# Patient Record
Sex: MALE | Race: WHITE | NOT HISPANIC OR LATINO | URBAN - METROPOLITAN AREA
[De-identification: names, ages, dates, MRNs, and addresses within clinical notes are randomized per-mention and may not be internally consistent; named-entity substitution may affect disease eponyms.]

---

## 2017-11-14 ENCOUNTER — IMPORTED ENCOUNTER (OUTPATIENT)
Dept: URBAN - METROPOLITAN AREA CLINIC 31 | Facility: CLINIC | Age: 77
End: 2017-11-14

## 2017-11-14 PROBLEM — H25.13: Noted: 2017-11-14

## 2017-11-14 PROBLEM — H40.003: Noted: 2017-11-14

## 2017-11-14 PROBLEM — H43.813: Noted: 2017-11-14

## 2017-11-14 PROCEDURE — 92014 COMPRE OPH EXAM EST PT 1/>: CPT

## 2017-11-14 PROCEDURE — 92250 FUNDUS PHOTOGRAPHY W/I&R: CPT

## 2017-11-14 NOTE — PATIENT DISCUSSION
1.  Nuclear Sclerotic Cataract OU: Explained how cataracts can effect vision. Recommend clinical observation. The patient was advised to contact us if any change or worsening of vision. 2. Glaucoma suspect OU - No signs of glaucomatous damage to the optic nerve based on todays examination and testing. Will continue to monitor. Get OCT and TA/PACH in six moths. Possible VF3. PVD OU:  Patient was cautioned to call our office immediately if they experience a substantial change in their symptoms such as an increase in floaters persistent flashes loss of visual field (may appear as a shadow or a curtain) or decrease in visual acuity as these may indicate a retinal tear or detachment.   If this is a new problem patient will need to return for re-examination  as determined by the physician

## 2018-01-15 ENCOUNTER — IMPORTED ENCOUNTER (OUTPATIENT)
Dept: URBAN - METROPOLITAN AREA CLINIC 31 | Facility: CLINIC | Age: 78
End: 2018-01-15

## 2018-01-15 PROBLEM — H25.13: Noted: 2018-01-15

## 2018-01-15 PROCEDURE — 92015 DETERMINE REFRACTIVE STATE: CPT

## 2018-01-15 PROCEDURE — 99213 OFFICE O/P EST LOW 20 MIN: CPT

## 2018-03-12 ENCOUNTER — IMPORTED ENCOUNTER (OUTPATIENT)
Dept: URBAN - METROPOLITAN AREA CLINIC 31 | Facility: CLINIC | Age: 78
End: 2018-03-12

## 2018-03-12 PROCEDURE — 92133 CPTRZD OPH DX IMG PST SGM ON: CPT

## 2018-03-19 ENCOUNTER — IMPORTED ENCOUNTER (OUTPATIENT)
Dept: URBAN - METROPOLITAN AREA CLINIC 31 | Facility: CLINIC | Age: 78
End: 2018-03-19

## 2018-03-19 PROBLEM — H40.1111: Noted: 2018-03-19

## 2018-03-19 PROBLEM — H40.003: Noted: 2018-03-19

## 2018-03-19 PROBLEM — H40.1122: Noted: 2018-03-19

## 2018-03-19 PROBLEM — H40.1211: Noted: 2018-03-19

## 2018-03-19 PROBLEM — H40.1222: Noted: 2018-03-19

## 2018-03-19 PROCEDURE — 92083 EXTENDED VISUAL FIELD XM: CPT

## 2018-03-19 PROCEDURE — 99213 OFFICE O/P EST LOW 20 MIN: CPT

## 2018-03-19 NOTE — PATIENT DISCUSSION
Low Tension Glaucoma OS - Continue with current treatment plan. Discussed importance of compliance. Will continue to monitor.

## 2018-03-19 NOTE — PATIENT DISCUSSION
Primary open angle glaucoma OD mild:  Continue with current treatment plan. Discussed importance of compliance. Will continue to monitor.

## 2018-03-19 NOTE — PATIENT DISCUSSION
Primary open angle glaucoma OS moderate- Continue with current treatment plan. Discussed importance of compliance. Will continue to monitor.

## 2018-03-19 NOTE — PATIENT DISCUSSION
1.  Low Tension Glaucoma OD     2. Low Tension Glaucoma OS 3. Start prostaglandin ou hs. Sample of Lumigan given. F/U 1-2 weeks TA and PACH.

## 2018-03-28 ENCOUNTER — IMPORTED ENCOUNTER (OUTPATIENT)
Dept: URBAN - METROPOLITAN AREA CLINIC 31 | Facility: CLINIC | Age: 78
End: 2018-03-28

## 2018-03-28 PROBLEM — H40.1111: Noted: 2018-03-28

## 2018-03-28 PROBLEM — H40.053: Noted: 2018-03-28

## 2018-03-28 PROBLEM — H40.1131: Noted: 2018-03-28

## 2018-03-28 PROBLEM — H40.1122: Noted: 2018-03-28

## 2018-03-28 PROCEDURE — 76514 ECHO EXAM OF EYE THICKNESS: CPT

## 2018-03-28 PROCEDURE — 99213 OFFICE O/P EST LOW 20 MIN: CPT

## 2018-03-28 NOTE — PATIENT DISCUSSION
Ocular HTN OU:  Elevated intraocular pressure without signs of glaucomatous damage to the optic nerve. Will continue to monitor.

## 2018-03-28 NOTE — PATIENT DISCUSSION
Primary open angle glaucoma OU mild - Continue with current treatment plan. Discussed importance of compliance. Will continue to monitor.

## 2018-03-28 NOTE — PATIENT DISCUSSION
1.  Primary open angle glaucoma OD mild:  Continue with current treatment plan. Discussed importance of compliance. Will continue to monitor. 2.  Primary open angle glaucoma OS moderate- Continue with current treatment plan. Discussed importance of compliance. Will continue to monitor. 3.  Finish sample and Rx Latanoprost OU hs.  F/U 6 mos TA.

## 2018-11-16 ENCOUNTER — IMPORTED ENCOUNTER (OUTPATIENT)
Dept: URBAN - METROPOLITAN AREA CLINIC 31 | Facility: CLINIC | Age: 78
End: 2018-11-16

## 2018-11-16 PROBLEM — H40.1122: Noted: 2018-11-16

## 2018-11-16 PROBLEM — H40.1111: Noted: 2018-11-16

## 2018-11-16 PROCEDURE — 92250 FUNDUS PHOTOGRAPHY W/I&R: CPT

## 2018-11-16 PROCEDURE — 99213 OFFICE O/P EST LOW 20 MIN: CPT

## 2018-11-16 NOTE — PATIENT DISCUSSION
1.  Primary open angle glaucoma OD mild:  Continue with current treatment plan. Discussed importance of compliance. Will continue to monitor. 2.  Primary open angle glaucoma OS moderate- Continue with current treatment plan. Discussed importance of compliance. Will continue to monitor. 3. CE/VF 6 mos.

## 2019-11-01 ENCOUNTER — IMPORTED ENCOUNTER (OUTPATIENT)
Dept: URBAN - METROPOLITAN AREA CLINIC 31 | Facility: CLINIC | Age: 79
End: 2019-11-01

## 2019-11-01 PROBLEM — H40.1111: Noted: 2019-11-01

## 2019-11-01 PROBLEM — H25.812: Noted: 2019-11-01

## 2019-11-01 PROBLEM — H40.1122: Noted: 2019-11-01

## 2019-11-01 PROBLEM — H25.811: Noted: 2019-11-01

## 2019-11-01 PROCEDURE — 92083 EXTENDED VISUAL FIELD XM: CPT

## 2019-11-01 PROCEDURE — 92250 FUNDUS PHOTOGRAPHY W/I&R: CPT

## 2019-11-01 PROCEDURE — 92014 COMPRE OPH EXAM EST PT 1/>: CPT

## 2019-11-01 PROCEDURE — 92015 DETERMINE REFRACTIVE STATE: CPT

## 2019-11-01 NOTE — PATIENT DISCUSSION
1.  Primary open angle glaucoma OS moderate- Continue with current treatment plan. Discussed importance of compliance. Will continue to monitor. 2.  Primary open angle glaucoma OD mild:  Continue with current treatment plan. Discussed importance of compliance. Will continue to monitor. 3.  Combined Types of Cataract OD: Explained how cataracts can effect vision. Recommend clinical observation. The patient was advised to contact us if any change or worsening of vision. 4. Combined Types of Cataract OS: Cat consult with Dr. Ama Santana. T/C iStent as well? Will refract post Sx for glasses with prism.

## 2020-01-24 ENCOUNTER — IMPORTED ENCOUNTER (OUTPATIENT)
Dept: URBAN - METROPOLITAN AREA CLINIC 31 | Facility: CLINIC | Age: 80
End: 2020-01-24

## 2020-01-24 PROBLEM — H25.813: Noted: 2020-01-24

## 2020-01-24 PROBLEM — H40.1132: Noted: 2020-01-24

## 2020-01-24 PROBLEM — H43.813: Noted: 2020-01-24

## 2020-01-24 PROCEDURE — 99213 OFFICE O/P EST LOW 20 MIN: CPT

## 2020-01-24 PROCEDURE — 92133 CPTRZD OPH DX IMG PST SGM ON: CPT

## 2020-01-24 NOTE — PATIENT DISCUSSION
1.  Discussed the risks benefits alternatives and limitations of cataract surgery with iStent  including infection bleeding loss of vision retinal tears detachment. The patient stated a full understanding and a desire to proceed with the procedure in both eyes. Refractive options were reviewed. Patient has elected to be optimized for distance vision in both eyes. The patient will still need glasses for correcting prism  reading and to possibly fine tune distance vision. DC ASA  5days before surgerySchedule KPE/IOL OS with iStent t/c OD when worse. 2.  iStent to be performed at the time of surgery to optimize IOP and reduce the need for glaucoma medications. Risks and benefits reviewed including bleeding. 3.  Primary Open Angle Glaucoma OU moderate -  Continue with current treatment plan. Discussed importance of compliance. Will continue to monitor for stability or progression. 4. PVD OU:  Patient was cautioned to call our office immediately if they experience a substantial change in their symptoms such as an increase in floaters persistent flashes loss of visual field (may appear as a shadow or a curtain) or decrease in visual acuity as these may indicate a retinal tear or detachment. If this is a new problem patient will need to return for re-examination  as determined by the [address] [place] [id number]. Return for an appointment for [address] [place] [address] with Dr. Hong Muniz.

## 2020-01-24 NOTE — PATIENT DISCUSSION
iStent to be performed at the time of surgery to optimize IOP and reduce the need for glaucoma medications. Risks and benefits reviewed including bleeding.

## 2020-01-24 NOTE — PATIENT DISCUSSION
Primary Open Angle Glaucoma OU moderate -  Continue with current treatment plan. Discussed importance of compliance. Will continue to monitor for stability or progression.

## 2020-02-04 ENCOUNTER — IMPORTED ENCOUNTER (OUTPATIENT)
Dept: URBAN - METROPOLITAN AREA CLINIC 31 | Facility: CLINIC | Age: 80
End: 2020-02-04

## 2020-02-04 PROBLEM — H25.812: Noted: 2020-02-04

## 2020-02-04 PROCEDURE — 92136 OPHTHALMIC BIOMETRY: CPT

## 2020-02-25 ENCOUNTER — IMPORTED ENCOUNTER (OUTPATIENT)
Dept: URBAN - METROPOLITAN AREA CLINIC 31 | Facility: CLINIC | Age: 80
End: 2020-02-25

## 2020-02-25 PROBLEM — Z96.1: Noted: 2020-02-25

## 2020-02-25 PROCEDURE — 99024 POSTOP FOLLOW-UP VISIT: CPT

## 2020-02-25 NOTE — PATIENT DISCUSSION
Post-Op Day #1 - Cataract Surgery Left Eye (OS) - doing well. Tears prn. Continue postop drops as directed.    Call office with symptoms of pain redness or decreased vision in operative eye.  1 drop of tobramycin instilled

## 2020-03-03 ENCOUNTER — IMPORTED ENCOUNTER (OUTPATIENT)
Dept: URBAN - METROPOLITAN AREA CLINIC 31 | Facility: CLINIC | Age: 80
End: 2020-03-03

## 2020-03-03 PROBLEM — Z96.1: Noted: 2020-03-03

## 2020-03-03 PROCEDURE — 99024 POSTOP FOLLOW-UP VISIT: CPT

## 2020-12-01 ENCOUNTER — OFFICE VISIT (OUTPATIENT)
Dept: URBAN - METROPOLITAN AREA TELEHEALTH 2 | Facility: TELEHEALTH | Age: 80
End: 2020-12-01

## 2021-01-14 ENCOUNTER — OFFICE VISIT (OUTPATIENT)
Dept: URBAN - METROPOLITAN AREA SURGERY CENTER 4 | Facility: SURGERY CENTER | Age: 81
End: 2021-01-14

## 2021-01-18 LAB — PATHOLOGY (INDENTED REPORT): (no result)

## 2021-01-28 ENCOUNTER — IMPORTED ENCOUNTER (OUTPATIENT)
Dept: URBAN - METROPOLITAN AREA CLINIC 31 | Facility: CLINIC | Age: 81
End: 2021-01-28

## 2021-01-28 PROBLEM — H25.813: Noted: 2021-01-28

## 2021-01-28 PROBLEM — Z96.1: Noted: 2021-01-28

## 2021-01-28 PROBLEM — H25.811: Noted: 2021-01-28

## 2021-01-28 PROBLEM — H43.813: Noted: 2021-01-28

## 2021-01-28 PROCEDURE — 92250 FUNDUS PHOTOGRAPHY W/I&R: CPT

## 2021-01-28 PROCEDURE — 92015 DETERMINE REFRACTIVE STATE: CPT

## 2021-01-28 PROCEDURE — 92014 COMPRE OPH EXAM EST PT 1/>: CPT

## 2021-01-28 NOTE — PATIENT DISCUSSION
1.  PVD OU:  Patient was cautioned to call our office immediately if they experience a substantial change in their symptoms such as an increase in floaters persistent flashes loss of visual field (may appear as a shadow or a curtain) or decrease in visual acuity as these may indicate a retinal tear or detachment. If this is a new problem patient will need to return for re-examination  as determined by the [address] Combined Types of Cataract OD: Explained how cataracts can effect vision. Recommend clinical observation. The patient was advised to contact us if any change or worsening of vision. 3. Pseudophakia OS - IOL stable. Monitor for changes in vision. TA check in the Fall.

## 2021-11-29 ENCOUNTER — IMPORTED ENCOUNTER (OUTPATIENT)
Dept: URBAN - METROPOLITAN AREA CLINIC 31 | Facility: CLINIC | Age: 81
End: 2021-11-29

## 2021-11-29 PROBLEM — H25.811: Noted: 2021-11-29

## 2021-11-29 PROBLEM — Z96.1: Noted: 2021-11-29

## 2021-11-29 PROBLEM — H40.1132: Noted: 2021-11-29

## 2021-11-29 PROCEDURE — 99213 OFFICE O/P EST LOW 20 MIN: CPT

## 2021-12-15 ENCOUNTER — OFFICE VISIT (OUTPATIENT)
Dept: URBAN - METROPOLITAN AREA CLINIC 60 | Facility: CLINIC | Age: 81
End: 2021-12-15

## 2022-01-04 ENCOUNTER — OFFICE VISIT (OUTPATIENT)
Dept: URBAN - METROPOLITAN AREA CLINIC 63 | Facility: CLINIC | Age: 82
End: 2022-01-04

## 2022-04-01 ASSESSMENT — TONOMETRY
OD_IOP_MMHG: 19
OS_IOP_MMHG: 14
OS_IOP_MMHG: 10
OS_IOP_MMHG: 14
OD_IOP_MMHG: 13
OS_IOP_MMHG: 11
OD_IOP_MMHG: 13
OD_IOP_MMHG: 13
OS_IOP_MMHG: 19
OS_IOP_MMHG: 16
OD_IOP_MMHG: 14
OS_IOP_MMHG: 10
OD_IOP_MMHG: 10
OD_IOP_MMHG: 10
OD_IOP_MMHG: 16
OS_IOP_MMHG: 12
OD_IOP_MMHG: 12
OS_IOP_MMHG: 13
OS_IOP_MMHG: 14

## 2022-04-01 ASSESSMENT — VISUAL ACUITY
OD_SC: 20/20
OD_SC: 20/20
OD_CC: J1+-3
OS_SC: 20/40-2
OS_PH: SC 20/25 -2
OS_SC: 20/30-3
OD_SC: 20/20
OS_PH: CC 20/30 +2
OD_SC: 20/25-2
OD_SC: 20/20-3
OS_SC: 20/20-2
OS_CC: J1+1
OD_SC: 20/25-1
OS_SC: 20/40-2
OS_CC: J114''
OS_CC: J114''
OS_SC: 20/20
OS_PH: SC 20/25 -2
OD_SC: 20/20-2
OS_SC: 20/30
OD_SC: 20/20-1
OD_SC: 20/25
OD_CC: J114''
OS_GLARE: 20/50
OD_SC: 20/25
OS_CC: J214''
OS_SC: 20/20-1
OS_CC: 20/20
OD_SC: 20/25
OS_SC: 20/40-2

## 2022-07-09 ENCOUNTER — TELEPHONE ENCOUNTER (OUTPATIENT)
Dept: URBAN - METROPOLITAN AREA CLINIC 121 | Facility: CLINIC | Age: 82
End: 2022-07-09

## 2022-07-09 RX ORDER — ELECTROLYTES/DEXTROSE
SOLUTION, ORAL ORAL ONCE A DAY
Refills: 0 | OUTPATIENT
Start: 2018-02-02 | End: 2021-12-15

## 2022-07-09 RX ORDER — SIMVASTATIN 40 MG/1
TABLET, FILM COATED ORAL
Refills: 0 | OUTPATIENT
Start: 2021-09-29 | End: 2021-12-15

## 2022-07-09 RX ORDER — AMLODIPINE BESYLATE 2.5 MG/1
TABLET ORAL
Refills: 0 | OUTPATIENT
Start: 2012-03-28 | End: 2018-02-02

## 2022-07-09 RX ORDER — CALCIUM NO.38/D3/MAG/BORON ASP 500MG/15ML
LIQUID (ML) ORAL ONCE A DAY
Refills: 0 | OUTPATIENT
Start: 2018-02-02 | End: 2021-12-15

## 2022-07-09 RX ORDER — LOSARTAN POTASSIUM 100 MG/1
TABLET, FILM COATED ORAL
Refills: 0 | OUTPATIENT
Start: 2021-09-29 | End: 2021-12-15

## 2022-07-09 RX ORDER — OMEPRAZOLE 20 MG/1
TWICE A DAY CAPSULE, DELAYED RELEASE ORAL TWICE A DAY
Refills: 3 | OUTPATIENT
Start: 2021-01-14 | End: 2021-11-04

## 2022-07-09 RX ORDER — LATANOPROST/PF 0.005 %
DROPS OPHTHALMIC (EYE)
Refills: 0 | OUTPATIENT
Start: 2020-07-07 | End: 2021-12-15

## 2022-07-09 RX ORDER — ASPIRIN 81 MG/1
TABLET, DELAYED RELEASE ORAL ONCE A DAY
Refills: 0 | OUTPATIENT
Start: 2018-02-02 | End: 2021-12-15

## 2022-07-09 RX ORDER — OMEPRAZOLE 20 MG/1
TWICE A DAY CAPSULE, DELAYED RELEASE ORAL TWICE A DAY
Refills: 0 | OUTPATIENT
Start: 2021-11-04 | End: 2022-01-17

## 2022-07-09 RX ORDER — SIMVASTATIN 20 MG/1
TABLET, FILM COATED ORAL
Refills: 0 | OUTPATIENT
Start: 2012-03-28 | End: 2018-02-02

## 2022-07-09 RX ORDER — OMEPRAZOLE 20 MG/1
CAPSULE, DELAYED RELEASE ORAL
Refills: 0 | OUTPATIENT
Start: 2021-12-06 | End: 2021-12-15

## 2022-07-09 RX ORDER — OMEPRAZOLE 20 MG/1
CAPSULE, DELAYED RELEASE ORAL
Refills: 0 | OUTPATIENT
Start: 2012-03-28 | End: 2018-02-02

## 2022-07-09 RX ORDER — LOSARTAN POTASSIUM 50 MG/1
TABLET, FILM COATED ORAL ONCE A DAY
Refills: 0 | OUTPATIENT
Start: 2018-02-02 | End: 2021-12-15

## 2022-07-09 RX ORDER — AMLODIPINE BESYLATE 2.5 MG/1
TABLET ORAL ONCE A DAY
Refills: 0 | OUTPATIENT
Start: 2018-02-02 | End: 2021-12-15

## 2022-07-10 ENCOUNTER — TELEPHONE ENCOUNTER (OUTPATIENT)
Dept: URBAN - METROPOLITAN AREA CLINIC 121 | Facility: CLINIC | Age: 82
End: 2022-07-10

## 2022-07-10 RX ORDER — CALCIUM NO.38/D3/MAG/BORON ASP 500MG/15ML
LIQUID (ML) ORAL ONCE A DAY
Refills: 0 | Status: ACTIVE | COMMUNITY
Start: 2021-12-15

## 2022-07-10 RX ORDER — MULTIVIT-MIN/IRON/FOLIC ACID/K 18-600-40
CAPSULE ORAL ONCE A DAY
Refills: 0 | Status: ACTIVE | COMMUNITY
Start: 2018-02-02

## 2022-07-10 RX ORDER — ELECTROLYTES/DEXTROSE
SOLUTION, ORAL ORAL ONCE A DAY
Refills: 0 | Status: ACTIVE | COMMUNITY
Start: 2021-12-15

## 2022-07-10 RX ORDER — OMEPRAZOLE 20 MG/1
CAPSULE, DELAYED RELEASE ORAL
Refills: 0 | Status: ACTIVE | COMMUNITY
Start: 2021-12-15

## 2022-07-10 RX ORDER — LOSARTAN POTASSIUM 100 MG/1
TABLET, FILM COATED ORAL
Refills: 0 | Status: ACTIVE | COMMUNITY
Start: 2021-12-15

## 2022-07-10 RX ORDER — OMEPRAZOLE 20 MG/1
TAKE 1 CAPSULE TWICE DAILY CAPSULE, DELAYED RELEASE ORAL
Refills: 3 | Status: ACTIVE | COMMUNITY
Start: 2022-01-17

## 2022-07-10 RX ORDER — OMEPRAZOLE 20 MG/1
CAPSULE, DELAYED RELEASE ORAL ONCE A DAY
Refills: 0 | Status: ACTIVE | COMMUNITY
Start: 2018-02-02

## 2022-07-10 RX ORDER — ASPIRIN 81 MG/1
TABLET, DELAYED RELEASE ORAL ONCE A DAY
Refills: 0 | Status: ACTIVE | COMMUNITY
Start: 2021-12-15

## 2022-07-10 RX ORDER — SIMVASTATIN 40 MG/1
TABLET, FILM COATED ORAL
Refills: 0 | Status: ACTIVE | COMMUNITY
Start: 2021-12-15

## 2022-07-10 RX ORDER — SIMVASTATIN 40 MG/1
TABLET, FILM COATED ORAL ONCE A DAY
Refills: 0 | Status: ACTIVE | COMMUNITY
Start: 2018-02-02